# Patient Record
Sex: FEMALE | Race: WHITE | NOT HISPANIC OR LATINO | Employment: FULL TIME | URBAN - METROPOLITAN AREA
[De-identification: names, ages, dates, MRNs, and addresses within clinical notes are randomized per-mention and may not be internally consistent; named-entity substitution may affect disease eponyms.]

---

## 2017-07-11 ENCOUNTER — OFFICE VISIT (OUTPATIENT)
Dept: ORTHOPEDIC SURGERY | Facility: CLINIC | Age: 47
End: 2017-07-11

## 2017-07-11 VITALS
WEIGHT: 163 LBS | DIASTOLIC BLOOD PRESSURE: 92 MMHG | HEIGHT: 66 IN | SYSTOLIC BLOOD PRESSURE: 159 MMHG | HEART RATE: 73 BPM | BODY MASS INDEX: 26.2 KG/M2

## 2017-07-11 DIAGNOSIS — M22.2X2 PATELLOFEMORAL STRESS SYNDROME OF BOTH KNEES: ICD-10-CM

## 2017-07-11 DIAGNOSIS — R52 PAIN: Primary | ICD-10-CM

## 2017-07-11 DIAGNOSIS — M22.2X1 PATELLOFEMORAL STRESS SYNDROME OF BOTH KNEES: ICD-10-CM

## 2017-07-11 PROCEDURE — 73562 X-RAY EXAM OF KNEE 3: CPT | Performed by: ORTHOPAEDIC SURGERY

## 2017-07-11 PROCEDURE — 99203 OFFICE O/P NEW LOW 30 MIN: CPT | Performed by: ORTHOPAEDIC SURGERY

## 2017-07-11 RX ORDER — FLUOXETINE HYDROCHLORIDE 20 MG/1
CAPSULE ORAL
COMMUNITY
Start: 2017-06-15

## 2017-07-11 RX ORDER — ACETAMINOPHEN AND CODEINE PHOSPHATE 120; 12 MG/5ML; MG/5ML
SOLUTION ORAL
COMMUNITY
Start: 2017-07-05

## 2017-07-11 RX ORDER — HYDROCHLOROTHIAZIDE 25 MG/1
TABLET ORAL
COMMUNITY
Start: 2017-06-15

## 2017-07-11 NOTE — PROGRESS NOTES
Subjective:     Patient ID: Ignacia Curry is a 46 y.o. female.    Chief Complaint:  Bilateral knee pain  History of Present Illness  Ignacia Curry presents to clinic today for evaluation of bilateral knee pain, left greater than right is been present for proximally last 2-3 years, denies any specific injury prior to the onset of pain.  Describes pain largely as aching in nature but does note occasional sharp pain, localized to the anterior aspect of bilateral knees, has noted some associated weakness particularly with prolonged activities including stair climbing, prolonged walking, and deep flexion activities.  Pain is worse with stair climbing as well as when she is taking her first steps out of bed in the morning.  Notes minimal intermittent swelling does wax and wane, denies any mario locking or catching.  Rates associated pain as an 8 out of 10 at its worst.  She has not had prior injections, physical therapy, or bracing.  Denies numbness or tingling bilateral lower extremities, denies hip pain.     Social History     Occupational History   • Not on file.     Social History Main Topics   • Smoking status: Never Smoker   • Smokeless tobacco: Never Used   • Alcohol use Yes      Comment: OCC   • Drug use: No   • Sexual activity: Defer      Past Medical History:   Diagnosis Date   • Hypertension      Past Surgical History:   Procedure Laterality Date   • APPENDECTOMY  1970   • TONSILLECTOMY  1988       Family History   Problem Relation Age of Onset   • Hypertension Mother    • Cancer Father    • Hypertension Father    • Heart disease Father    • Diabetes Father          Review of Systems   Constitutional: Negative for chills, diaphoresis, fever and unexpected weight change.   HENT: Negative for hearing loss, nosebleeds, sore throat and tinnitus.    Eyes: Negative for pain and visual disturbance.   Respiratory: Negative for cough, shortness of breath and wheezing.    Cardiovascular: Negative for chest pain and  "palpitations.   Gastrointestinal: Negative for abdominal pain, diarrhea, nausea and vomiting.   Endocrine: Negative for cold intolerance, heat intolerance and polydipsia.   Genitourinary: Negative for difficulty urinating, dysuria and hematuria.   Musculoskeletal: Positive for myalgias. Negative for arthralgias and joint swelling.   Skin: Negative for rash and wound.   Allergic/Immunologic: Negative for environmental allergies.   Neurological: Positive for numbness. Negative for dizziness and syncope.   Hematological: Does not bruise/bleed easily.   Psychiatric/Behavioral: Negative for dysphoric mood and sleep disturbance. The patient is nervous/anxious.            Objective:  Vitals:    07/11/17 0927   BP: 159/92   BP Location: Right arm   Pulse: 73   Weight: 163 lb (73.9 kg)   Height: 65.5\" (166.4 cm)     Last 2 weights    07/11/17 0927   Weight: 163 lb (73.9 kg)     Body mass index is 26.71 kg/(m^2).  Physical Exam    Vital signs reviewed.   General: No acute distress.  Eyes: conjunctiva clear; pupils equally round and reactive  ENT: external ears and nose atraumatic; oropharynx clear  CV: no peripheral edema  Resp: normal respiratory effort  Skin: no rashes or wounds; normal turgor  Psych: mood and affect appropriate; recent and remote memory intact       Ortho Exam    Bilateral knees-active range of motion 0-135°, 4+ out of 5 strength on flexion and extension, maximal tenderness palpation along medial patellar facet bilaterally positive active patellar compression test, worse on the left than the right.  Grade 1A Lachman, negative anterior posterior drawer, stable to varus and valgus stress at 0 and 30°.  No hip pain on logroll or Stinchfield exam bilaterally.  Negative straight leg raise bilateral lower extremities.    Imaging:  Bilateral Knee X-Ray  Indication: Pain    AP, Lateral, and [place] views    Findings:  No fracture  No bony lesion  Normal soft tissues  Normal joint spaces    No prior studies were " available for comparison.    Assessment:       1. Pain    2. Patellofemoral stress syndrome of both knees          Plan:  BIANCA query complete.          Discussed treatment options at length with patient at today's visit. We'll work on home strengthening program, , consider supplementation with glucosamine and chondroitin and turmeric.  If pain symptoms continue may consider advanced imaging versus intra-articular injection to help with symptoms.  Recommended use of knee sleeve during activities.  Avoiding deep flexion with weighted activities.    Ignacia Curry was in agreement with plan and had all questions answered.     Orders:  Orders Placed This Encounter   Procedures   • XR Knee 3 View Bilateral       Medications:  No orders of the defined types were placed in this encounter.      Followup:  Return in about 6 weeks (around 8/22/2017).          Dragon transcription disclaimer     Much of this encounter note is an electronic transcription/translation of spoken language to printed text. The electronic translation of spoken language may permit erroneous, or at times, nonsensical words or phrases to be inadvertently transcribed. Although I have reviewed the note for such errors, some may still exist.